# Patient Record
Sex: FEMALE
[De-identification: names, ages, dates, MRNs, and addresses within clinical notes are randomized per-mention and may not be internally consistent; named-entity substitution may affect disease eponyms.]

---

## 2022-08-08 ENCOUNTER — APPOINTMENT (OUTPATIENT)
Dept: ORTHOPEDIC SURGERY | Facility: CLINIC | Age: 42
End: 2022-08-08

## 2022-08-08 VITALS — WEIGHT: 163 LBS | BODY MASS INDEX: 30 KG/M2 | HEIGHT: 62 IN

## 2022-08-08 DIAGNOSIS — M62.838 OTHER MUSCLE SPASM: ICD-10-CM

## 2022-08-08 DIAGNOSIS — M54.12 RADICULOPATHY, CERVICAL REGION: ICD-10-CM

## 2022-08-08 DIAGNOSIS — E11.9 TYPE 2 DIABETES MELLITUS W/OUT COMPLICATIONS: ICD-10-CM

## 2022-08-08 PROBLEM — Z00.00 ENCOUNTER FOR PREVENTIVE HEALTH EXAMINATION: Status: ACTIVE | Noted: 2022-08-08

## 2022-08-08 PROCEDURE — 99203 OFFICE O/P NEW LOW 30 MIN: CPT

## 2022-08-08 PROCEDURE — 73010 X-RAY EXAM OF SHOULDER BLADE: CPT | Mod: RT

## 2022-08-08 PROCEDURE — 73030 X-RAY EXAM OF SHOULDER: CPT | Mod: RT

## 2022-08-08 PROCEDURE — 72040 X-RAY EXAM NECK SPINE 2-3 VW: CPT

## 2022-08-08 RX ORDER — METHYLPREDNISOLONE 4 MG/1
4 TABLET ORAL
Qty: 1 | Refills: 0 | Status: ACTIVE | COMMUNITY
Start: 2022-08-08 | End: 1900-01-01

## 2022-08-08 NOTE — HISTORY OF PRESENT ILLNESS
[Sudden] : sudden [9] : 9 [5] : 5 [Diffuse] : diffuse [Radiating] : radiating [Sharp] : sharp [Meds] : meds [de-identified] : 8/8/22: Patient is a 40 yo RHD female c/o right shoulder and neck pain for 1 week with no specific injury. Pain radiates down right arm. Having n/t. Taking naproxen with no relief. Was seen at urgent care, gave muscle relaxers with no relief. Pain is worse with sleeping and sudden movements. No previous injuries or surgeries to right shoulder or neck. \par Occupation: Medical Student  [] : no [FreeTextEntry1] : rt shoulder  [FreeTextEntry5] :  ZOBEH GHAFFAR is a 41 year female who is here today for rt shoulder. She has been having pain for about a week. no known injury. Has weakness in arm and muscle spasm.  [FreeTextEntry7] : down arm  [FreeTextEntry9] : naproxen [de-identified] : a [de-identified] : urgent care

## 2022-08-08 NOTE — PHYSICAL EXAM
[Straightening consistent with spasm] : Straightening consistent with spasm [NL (0-180)] : full active forward flexion 0-180 degrees [NL (0-70)] : full internal rotation 0-70 degrees [NL (0-90)] : full external rotation 0-90 degrees [5 ___] : forward flexion 5[unfilled]/5 [5___] : internal rotation 5[unfilled]/5 [Right] : right shoulder [There are no fractures, subluxations or dislocations. No significant abnormalities are seen] : There are no fractures, subluxations or dislocations. No significant abnormalities are seen [] : negative Padilla

## 2022-08-08 NOTE — ASSESSMENT
[FreeTextEntry1] : Xrays reviewed with patient\par Treatment options discussed\par MDP sent for pain and inflammation\par Recommend course of PT/HEP\par Follow up prn